# Patient Record
Sex: FEMALE | Race: WHITE | Employment: OTHER | URBAN - METROPOLITAN AREA
[De-identification: names, ages, dates, MRNs, and addresses within clinical notes are randomized per-mention and may not be internally consistent; named-entity substitution may affect disease eponyms.]

---

## 2020-06-29 ENCOUNTER — TELEPHONE (OUTPATIENT)
Dept: GASTROENTEROLOGY | Age: 85
End: 2020-06-29

## 2020-06-30 NOTE — TELEPHONE ENCOUNTER
Patient states she is getting a new PCP and has an appt the first week in August. She will have them send a referral at that visit.

## 2020-09-01 PROBLEM — E06.3 HASHIMOTO'S THYROIDITIS: Status: ACTIVE | Noted: 2020-09-01

## 2020-09-01 PROBLEM — E04.2 MULTINODULAR GOITER: Status: ACTIVE | Noted: 2020-09-01

## 2021-08-30 PROBLEM — E03.9 HYPOTHYROIDISM: Status: ACTIVE | Noted: 2020-12-08

## 2021-08-30 PROBLEM — R60.0 EDEMA OF LOWER EXTREMITY: Status: ACTIVE | Noted: 2020-12-08

## 2021-08-30 PROBLEM — H93.19 TINNITUS: Status: ACTIVE | Noted: 2020-12-08

## 2021-08-30 PROBLEM — H40.9 GLAUCOMA: Status: ACTIVE | Noted: 2020-12-08

## 2021-08-30 PROBLEM — I10 ESSENTIAL HYPERTENSION: Status: ACTIVE | Noted: 2020-12-08

## 2021-08-30 PROBLEM — J06.9 UPPER RESPIRATORY TRACT INFECTION: Status: ACTIVE | Noted: 2017-01-17

## 2021-08-30 PROBLEM — C44.90 MALIGNANT NEOPLASM OF SKIN: Status: ACTIVE | Noted: 2020-12-08

## 2021-08-30 PROBLEM — R00.2 PALPITATION: Status: ACTIVE | Noted: 2020-12-08

## 2021-08-30 PROBLEM — E07.9 THYROID DISEASE: Status: ACTIVE | Noted: 2020-12-08

## 2021-08-30 PROBLEM — I49.3 VENTRICULAR PREMATURE BEATS: Status: ACTIVE | Noted: 2020-12-08

## 2021-08-30 PROBLEM — H66.90 OTITIS MEDIA: Status: ACTIVE | Noted: 2020-12-08

## 2021-08-30 PROBLEM — J44.9 CHRONIC OBSTRUCTIVE PULMONARY DISEASE (HCC): Status: ACTIVE | Noted: 2020-12-08

## 2021-08-30 PROBLEM — Z00.00 PREVENTATIVE HEALTH CARE: Status: ACTIVE | Noted: 2017-01-17

## 2021-08-30 PROBLEM — Z00.00 ROUTINE HEALTH MAINTENANCE: Status: ACTIVE | Noted: 2020-12-08

## 2021-08-30 PROBLEM — K63.5 COLON POLYP: Status: ACTIVE | Noted: 2020-12-08

## 2021-08-30 PROBLEM — R03.0 ELEVATED BLOOD PRESSURE READING WITHOUT DIAGNOSIS OF HYPERTENSION: Status: ACTIVE | Noted: 2017-01-17

## 2021-08-30 PROBLEM — B02.9 HERPES ZOSTER: Status: ACTIVE | Noted: 2020-12-08

## 2021-08-30 PROBLEM — M81.0 OSTEOPOROSIS: Status: ACTIVE | Noted: 2020-12-08

## 2021-08-30 PROBLEM — T50.905A DRUG-INDUCED HYPERKALEMIA: Status: ACTIVE | Noted: 2020-12-08

## 2021-08-30 PROBLEM — E87.5 DRUG-INDUCED HYPERKALEMIA: Status: ACTIVE | Noted: 2020-12-08

## 2021-08-30 PROBLEM — R13.14 PHARYNGOESOPHAGEAL DYSPHAGIA: Status: ACTIVE | Noted: 2021-02-08

## 2021-08-30 PROBLEM — E74.39 MALABSORPTION OF GLUCOSE: Status: ACTIVE | Noted: 2020-12-08

## 2021-08-30 PROBLEM — K63.5 HYPERPLASTIC POLYP OF INTESTINE: Status: ACTIVE | Noted: 2020-12-08

## 2022-08-30 PROBLEM — E78.00 PURE HYPERCHOLESTEROLEMIA: Status: ACTIVE | Noted: 2021-10-14

## 2022-08-30 PROBLEM — R74.01 TRANSAMINITIS: Status: ACTIVE | Noted: 2022-06-16

## 2022-08-30 PROBLEM — E87.1 HYPONATREMIA: Status: ACTIVE | Noted: 2021-10-14
